# Patient Record
Sex: FEMALE | URBAN - METROPOLITAN AREA
[De-identification: names, ages, dates, MRNs, and addresses within clinical notes are randomized per-mention and may not be internally consistent; named-entity substitution may affect disease eponyms.]

---

## 2019-08-29 NOTE — PATIENT DISCUSSION
Refer for Blepharoplasty Evaluation. --disc previous repair but that now the upper lid is resting on eyelash margin. Eval with [facility].

## 2021-03-05 ENCOUNTER — IMPORTED ENCOUNTER (OUTPATIENT)
Dept: URBAN - METROPOLITAN AREA CLINIC 9 | Facility: CLINIC | Age: 61
End: 2021-03-05

## 2021-03-09 ENCOUNTER — IMPORTED ENCOUNTER (OUTPATIENT)
Dept: URBAN - METROPOLITAN AREA CLINIC 9 | Facility: CLINIC | Age: 61
End: 2021-03-09

## 2021-10-16 ASSESSMENT — VISUAL ACUITY
OD_CC: CF 2FT SN
OS_CC: 20/30 -2 SN
OD_SC: CF 2FT SN
OS_CC: 20/30 -2 SN
OD_CC: CF 8' LV
OS_CC: 20/25 + SN
OS_SC: 20/70 - SN

## 2021-10-16 ASSESSMENT — TONOMETRY
OD_IOP_MMHG: 19
OS_IOP_MMHG: 17
OD_IOP_MMHG: 19
OS_IOP_MMHG: 17